# Patient Record
Sex: FEMALE | Race: OTHER | Employment: OTHER | URBAN - METROPOLITAN AREA
[De-identification: names, ages, dates, MRNs, and addresses within clinical notes are randomized per-mention and may not be internally consistent; named-entity substitution may affect disease eponyms.]

---
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
## 2017-11-30 NOTE — PATIENT DISCUSSION
New Prescription: prednisolone acetate (prednisolone acetate): drops,suspension: 1% 1 drop four times a day as directed into right eye 11-

## 2017-11-30 NOTE — PATIENT DISCUSSION
New Prescription: ketorolac (ketorolac): drops: 0.4% 1 drop four times a day as directed into right eye 11-

## 2017-11-30 NOTE — PATIENT DISCUSSION
New Prescription: ofloxacin (ofloxacin): drops: 0.3% 1 drop four times a day as directed into right eye 11-

## 2018-01-11 NOTE — PATIENT DISCUSSION
Called patient and left message for patient informing of refill sent to pharmacy for 30 day supply and to call clinic to schedule with MD a blood pressure office visit.    S/P PCIOL, OD_ - STABLE, DOING WELL. OK TO PROCEED WITH FELLOW EYE SURGERY.

## 2018-01-11 NOTE — PATIENT DISCUSSION
Continue: prednisolone acetate (prednisolone acetate): drops,suspension: 1% 1 drop four times a day as directed into right eye 11-

## 2018-01-11 NOTE — PATIENT DISCUSSION
Surgery  Counseling: I have discussed the option of glasses versus cataract surgery versus following . It was explained that when vision no longer meets the patient?s visual needs and a new prescription for glasses is not likely to improve all of the patient?s visual symptoms, the option of cataract surgery is a reasonable next step. It was explained that there is no guarantee that removing the cataract will improve their visual symptoms, however; it is believed that the cataract is contributing to the patient's visual impairment and surgery may significantly improve both the visual and functional status of the patient. The risks, benefits and alternatives of surgery were discussed with the patient. After this discussion, the patient desires to proceed with cataract surgery with implantation of an intraocular lens to improve vision for night driving .

## 2018-01-26 NOTE — PATIENT DISCUSSION
S/P PC IOL, OS: GOOD POST OP RESULT. CONTINUE DROPS AS DIRECTED UNTIL FURTHER INSTRUCTION. DROP SHEET PROVIDED. RETURN FOR FOLLOW-UP AS SCHEDULED.

## 2018-02-15 ENCOUNTER — NEW PATIENT COMPREHENSIVE (OUTPATIENT)
Dept: URBAN - METROPOLITAN AREA CLINIC 35 | Facility: CLINIC | Age: 83
End: 2018-02-15

## 2018-02-15 DIAGNOSIS — H35.3134: ICD-10-CM

## 2018-02-15 DIAGNOSIS — H04.123: ICD-10-CM

## 2018-02-15 DIAGNOSIS — Z98.890: ICD-10-CM

## 2018-02-15 PROCEDURE — 1036F TOBACCO NON-USER: CPT

## 2018-02-15 PROCEDURE — G8427 DOCREV CUR MEDS BY ELIG CLIN: HCPCS

## 2018-02-15 PROCEDURE — 99204 OFFICE O/P NEW MOD 45 MIN: CPT

## 2018-02-15 PROCEDURE — 92015 DETERMINE REFRACTIVE STATE: CPT

## 2018-02-15 PROCEDURE — 92134 CPTRZ OPH DX IMG PST SGM RTA: CPT

## 2018-02-15 ASSESSMENT — VISUAL ACUITY
OS_SC: CF 1FT
OS_CC: CF 2FT
OD_CC: 20/80-1
OD_SC: 20/400

## 2018-02-15 ASSESSMENT — TONOMETRY
OS_IOP_MMHG: 13
OD_IOP_MMHG: 11

## 2018-05-22 ENCOUNTER — FOLLOW UP (OUTPATIENT)
Dept: URBAN - METROPOLITAN AREA CLINIC 35 | Facility: CLINIC | Age: 83
End: 2018-05-22

## 2018-05-22 DIAGNOSIS — H04.123: ICD-10-CM

## 2018-05-22 DIAGNOSIS — H35.3134: ICD-10-CM

## 2018-05-22 DIAGNOSIS — Z98.890: ICD-10-CM

## 2018-05-22 PROCEDURE — 92134 CPTRZ OPH DX IMG PST SGM RTA: CPT

## 2018-05-22 PROCEDURE — 1036F TOBACCO NON-USER: CPT

## 2018-05-22 PROCEDURE — G8427 DOCREV CUR MEDS BY ELIG CLIN: HCPCS

## 2018-05-22 PROCEDURE — 92015 DETERMINE REFRACTIVE STATE: CPT

## 2018-05-22 PROCEDURE — 92014 COMPRE OPH EXAM EST PT 1/>: CPT

## 2018-05-22 PROCEDURE — 2019F DILATED MACUL EXAM DONE: CPT

## 2018-05-22 PROCEDURE — 4177F TALK PT/CRGVR RE AREDS PREV: CPT

## 2018-05-22 ASSESSMENT — VISUAL ACUITY
OD_CC: 20/100-1
OD_PH: 20/100
OS_PH: CF 1FT
OD_CC: J10
OS_CC: CF 1FT
OS_CC: J12-

## 2018-05-22 ASSESSMENT — TONOMETRY
OD_IOP_MMHG: 15
OS_IOP_MMHG: 15

## 2018-08-02 ENCOUNTER — RETINA CONSULT (OUTPATIENT)
Dept: URBAN - METROPOLITAN AREA CLINIC 35 | Facility: CLINIC | Age: 83
End: 2018-08-02

## 2018-08-02 DIAGNOSIS — H35.3114: ICD-10-CM

## 2018-08-02 DIAGNOSIS — H35.3124: ICD-10-CM

## 2018-08-02 DIAGNOSIS — H43.813: ICD-10-CM

## 2018-08-02 DIAGNOSIS — H35.363: ICD-10-CM

## 2018-08-02 PROCEDURE — 9222550 BILAT EXTENDED OPHTHALMOSCOPY, FIRST

## 2018-08-02 PROCEDURE — 92250 FUNDUS PHOTOGRAPHY W/I&R: CPT

## 2018-08-02 PROCEDURE — G8428 CUR MEDS NOT DOCUMENT: HCPCS

## 2018-08-02 PROCEDURE — 1036F TOBACCO NON-USER: CPT

## 2018-08-02 PROCEDURE — 4177F TALK PT/CRGVR RE AREDS PREV: CPT

## 2018-08-02 PROCEDURE — G8756 NO BP MEASURE DOC: HCPCS

## 2018-08-02 PROCEDURE — 92235 FLUORESCEIN ANGRPH MLTIFRAME: CPT

## 2018-08-02 PROCEDURE — 2019F DILATED MACUL EXAM DONE: CPT

## 2018-08-02 PROCEDURE — 92014 COMPRE OPH EXAM EST PT 1/>: CPT

## 2018-08-02 ASSESSMENT — TONOMETRY
OS_IOP_MMHG: 14
OD_IOP_MMHG: 13

## 2018-08-02 ASSESSMENT — VISUAL ACUITY
OD_CC: 20/200+1
OS_CC: CF 3FT

## 2018-11-14 ENCOUNTER — ESTABLISHED COMPREHENSIVE EXAM (OUTPATIENT)
Dept: URBAN - METROPOLITAN AREA CLINIC 35 | Facility: CLINIC | Age: 83
End: 2018-11-14

## 2018-11-14 DIAGNOSIS — H35.3124: ICD-10-CM

## 2018-11-14 DIAGNOSIS — H04.123: ICD-10-CM

## 2018-11-14 DIAGNOSIS — H35.3114: ICD-10-CM

## 2018-11-14 DIAGNOSIS — H43.813: ICD-10-CM

## 2018-11-14 DIAGNOSIS — H35.363: ICD-10-CM

## 2018-11-14 PROCEDURE — 2019F DILATED MACUL EXAM DONE: CPT

## 2018-11-14 PROCEDURE — 92015 DETERMINE REFRACTIVE STATE: CPT

## 2018-11-14 PROCEDURE — 92014 COMPRE OPH EXAM EST PT 1/>: CPT

## 2018-11-14 PROCEDURE — 1036F TOBACCO NON-USER: CPT

## 2018-11-14 PROCEDURE — G8427 DOCREV CUR MEDS BY ELIG CLIN: HCPCS

## 2018-11-14 PROCEDURE — G9903 PT SCRN TBCO ID AS NON USER: HCPCS

## 2018-11-14 PROCEDURE — 4177F TALK PT/CRGVR RE AREDS PREV: CPT

## 2018-11-14 ASSESSMENT — VISUAL ACUITY
OD_CC: 20/100+
OD_SC: 20/400-
OD_CC: J8-
OS_CC: J12-
OS_PH: CF 4FT
OS_SC: CF 2FT
OS_CC: CF 2FT

## 2018-11-14 ASSESSMENT — TONOMETRY
OS_IOP_MMHG: 10
OD_IOP_MMHG: 10

## 2019-01-03 ENCOUNTER — ESTABLISHED COMPREHENSIVE EXAM (OUTPATIENT)
Dept: URBAN - METROPOLITAN AREA CLINIC 35 | Facility: CLINIC | Age: 84
End: 2019-01-03

## 2019-01-03 DIAGNOSIS — H35.3114: ICD-10-CM

## 2019-01-03 DIAGNOSIS — H43.813: ICD-10-CM

## 2019-01-03 DIAGNOSIS — H35.363: ICD-10-CM

## 2019-01-03 DIAGNOSIS — H35.723: ICD-10-CM

## 2019-01-03 DIAGNOSIS — H43.22: ICD-10-CM

## 2019-01-03 DIAGNOSIS — H35.3124: ICD-10-CM

## 2019-01-03 PROCEDURE — 92250 FUNDUS PHOTOGRAPHY W/I&R: CPT

## 2019-01-03 PROCEDURE — G9903 PT SCRN TBCO ID AS NON USER: HCPCS

## 2019-01-03 PROCEDURE — 2019F DILATED MACUL EXAM DONE: CPT

## 2019-01-03 PROCEDURE — 92235 FLUORESCEIN ANGRPH MLTIFRAME: CPT

## 2019-01-03 PROCEDURE — 92134 CPTRZ OPH DX IMG PST SGM RTA: CPT

## 2019-01-03 PROCEDURE — 92014 COMPRE OPH EXAM EST PT 1/>: CPT

## 2019-01-03 PROCEDURE — 1036F TOBACCO NON-USER: CPT

## 2019-01-03 PROCEDURE — 4177F TALK PT/CRGVR RE AREDS PREV: CPT

## 2019-01-03 PROCEDURE — 9222650 BILAT EXTENDED OPHTHALMOSCOPY, F/U

## 2019-01-03 PROCEDURE — G8756 NO BP MEASURE DOC: HCPCS

## 2019-01-03 PROCEDURE — G8427 DOCREV CUR MEDS BY ELIG CLIN: HCPCS

## 2019-01-03 ASSESSMENT — TONOMETRY
OS_IOP_MMHG: 11
OD_IOP_MMHG: 9

## 2019-01-03 ASSESSMENT — VISUAL ACUITY
OS_CC: CF 2FT
OD_CC: CF 6FT

## 2019-05-22 ENCOUNTER — ESTABLISHED COMPREHENSIVE EXAM (OUTPATIENT)
Dept: URBAN - METROPOLITAN AREA CLINIC 35 | Facility: CLINIC | Age: 84
End: 2019-05-22

## 2019-05-22 DIAGNOSIS — H35.3124: ICD-10-CM

## 2019-05-22 DIAGNOSIS — H35.3114: ICD-10-CM

## 2019-05-22 DIAGNOSIS — H35.723: ICD-10-CM

## 2019-05-22 DIAGNOSIS — H52.7: ICD-10-CM

## 2019-05-22 DIAGNOSIS — H43.22: ICD-10-CM

## 2019-05-22 DIAGNOSIS — H43.813: ICD-10-CM

## 2019-05-22 DIAGNOSIS — H04.123: ICD-10-CM

## 2019-05-22 DIAGNOSIS — H35.30: ICD-10-CM

## 2019-05-22 DIAGNOSIS — H35.363: ICD-10-CM

## 2019-05-22 PROCEDURE — 92015 DETERMINE REFRACTIVE STATE: CPT

## 2019-05-22 PROCEDURE — 92014 COMPRE OPH EXAM EST PT 1/>: CPT

## 2019-05-22 ASSESSMENT — VISUAL ACUITY
OS_CC: CF 2FT
OS_SC: CF 2FT
OD_CC: J10
OS_CC: J18
OD_SC: 20/400-
OD_CC: 20/100-1

## 2019-05-22 ASSESSMENT — TONOMETRY
OD_IOP_MMHG: 10
OS_IOP_MMHG: 10

## 2019-12-05 ENCOUNTER — ESTABLISHED COMPREHENSIVE EXAM (OUTPATIENT)
Dept: URBAN - METROPOLITAN AREA CLINIC 35 | Facility: CLINIC | Age: 84
End: 2019-12-05

## 2019-12-05 DIAGNOSIS — H43.813: ICD-10-CM

## 2019-12-05 DIAGNOSIS — H04.123: ICD-10-CM

## 2019-12-05 DIAGNOSIS — H35.363: ICD-10-CM

## 2019-12-05 DIAGNOSIS — H35.3124: ICD-10-CM

## 2019-12-05 DIAGNOSIS — H35.723: ICD-10-CM

## 2019-12-05 DIAGNOSIS — H35.3114: ICD-10-CM

## 2019-12-05 DIAGNOSIS — H35.30: ICD-10-CM

## 2019-12-05 DIAGNOSIS — H43.22: ICD-10-CM

## 2019-12-05 PROCEDURE — 92014 COMPRE OPH EXAM EST PT 1/>: CPT

## 2019-12-05 ASSESSMENT — VISUAL ACUITY
OS_CC: CF 2FT
OD_SC: 20/400
OD_PH: 20/100-
OD_CC: 20/200

## 2019-12-05 ASSESSMENT — TONOMETRY
OD_IOP_MMHG: 13
OS_IOP_MMHG: 11

## 2020-12-10 ENCOUNTER — ESTABLISHED COMPREHENSIVE EXAM (OUTPATIENT)
Dept: URBAN - METROPOLITAN AREA CLINIC 35 | Facility: CLINIC | Age: 85
End: 2020-12-10

## 2020-12-10 DIAGNOSIS — H43.813: ICD-10-CM

## 2020-12-10 DIAGNOSIS — H04.123: ICD-10-CM

## 2020-12-10 DIAGNOSIS — H35.3124: ICD-10-CM

## 2020-12-10 DIAGNOSIS — H35.363: ICD-10-CM

## 2020-12-10 DIAGNOSIS — H35.723: ICD-10-CM

## 2020-12-10 DIAGNOSIS — Z98.890: ICD-10-CM

## 2020-12-10 DIAGNOSIS — H43.22: ICD-10-CM

## 2020-12-10 DIAGNOSIS — H35.3114: ICD-10-CM

## 2020-12-10 DIAGNOSIS — H35.30: ICD-10-CM

## 2020-12-10 PROCEDURE — 92014 COMPRE OPH EXAM EST PT 1/>: CPT

## 2020-12-10 ASSESSMENT — VISUAL ACUITY
OD_SC: CF 3FT
OD_CC: 20/200
OS_CC: CF 2FT

## 2020-12-10 ASSESSMENT — TONOMETRY
OS_IOP_MMHG: 16
OD_IOP_MMHG: 14

## 2021-08-05 ENCOUNTER — EST. PATIENT EMERGENCY (OUTPATIENT)
Dept: URBAN - METROPOLITAN AREA CLINIC 35 | Facility: CLINIC | Age: 86
End: 2021-08-05

## 2021-08-05 DIAGNOSIS — H04.123: ICD-10-CM

## 2021-08-05 DIAGNOSIS — Z98.890: ICD-10-CM

## 2021-08-05 DIAGNOSIS — H43.22: ICD-10-CM

## 2021-08-05 DIAGNOSIS — H35.3114: ICD-10-CM

## 2021-08-05 DIAGNOSIS — H43.813: ICD-10-CM

## 2021-08-05 DIAGNOSIS — H35.3124: ICD-10-CM

## 2021-08-05 DIAGNOSIS — H35.723: ICD-10-CM

## 2021-08-05 DIAGNOSIS — H35.363: ICD-10-CM

## 2021-08-05 DIAGNOSIS — H35.30: ICD-10-CM

## 2021-08-05 PROCEDURE — 92014 COMPRE OPH EXAM EST PT 1/>: CPT

## 2021-08-05 ASSESSMENT — VISUAL ACUITY
OS_CC: CF 2FT
OD_CC: 20/200

## 2021-08-05 ASSESSMENT — TONOMETRY
OS_IOP_MMHG: 15
OD_IOP_MMHG: 14

## 2021-12-16 ENCOUNTER — COMPREHENSIVE EXAM (OUTPATIENT)
Dept: URBAN - METROPOLITAN AREA CLINIC 35 | Facility: CLINIC | Age: 86
End: 2021-12-16

## 2021-12-16 DIAGNOSIS — H35.30: ICD-10-CM

## 2021-12-16 DIAGNOSIS — H35.723: ICD-10-CM

## 2021-12-16 DIAGNOSIS — H35.3124: ICD-10-CM

## 2021-12-16 DIAGNOSIS — H35.3114: ICD-10-CM

## 2021-12-16 DIAGNOSIS — H43.22: ICD-10-CM

## 2021-12-16 DIAGNOSIS — H04.123: ICD-10-CM

## 2021-12-16 DIAGNOSIS — H43.813: ICD-10-CM

## 2021-12-16 DIAGNOSIS — H35.363: ICD-10-CM

## 2021-12-16 DIAGNOSIS — Z98.890: ICD-10-CM

## 2021-12-16 PROCEDURE — 92014 COMPRE OPH EXAM EST PT 1/>: CPT

## 2021-12-16 PROCEDURE — 92134 CPTRZ OPH DX IMG PST SGM RTA: CPT

## 2021-12-16 ASSESSMENT — VISUAL ACUITY: OD_CC: 20/400 BLURRY

## 2022-06-15 ENCOUNTER — APPOINTMENT (RX ONLY)
Dept: URBAN - METROPOLITAN AREA CLINIC 130 | Facility: CLINIC | Age: 87
Setting detail: DERMATOLOGY
End: 2022-06-15

## 2022-06-15 DIAGNOSIS — D18.0 HEMANGIOMA: ICD-10-CM

## 2022-06-15 DIAGNOSIS — Z85.828 PERSONAL HISTORY OF OTHER MALIGNANT NEOPLASM OF SKIN: ICD-10-CM

## 2022-06-15 DIAGNOSIS — D22 MELANOCYTIC NEVI: ICD-10-CM

## 2022-06-15 DIAGNOSIS — L57.0 ACTINIC KERATOSIS: ICD-10-CM

## 2022-06-15 DIAGNOSIS — L82.1 OTHER SEBORRHEIC KERATOSIS: ICD-10-CM

## 2022-06-15 DIAGNOSIS — L57.8 OTHER SKIN CHANGES DUE TO CHRONIC EXPOSURE TO NONIONIZING RADIATION: ICD-10-CM

## 2022-06-15 PROBLEM — D22.5 MELANOCYTIC NEVI OF TRUNK: Status: ACTIVE | Noted: 2022-06-15

## 2022-06-15 PROBLEM — D18.01 HEMANGIOMA OF SKIN AND SUBCUTANEOUS TISSUE: Status: ACTIVE | Noted: 2022-06-15

## 2022-06-15 PROCEDURE — 99203 OFFICE O/P NEW LOW 30 MIN: CPT | Mod: 25

## 2022-06-15 PROCEDURE — 17003 DESTRUCT PREMALG LES 2-14: CPT

## 2022-06-15 PROCEDURE — ? COUNSELING

## 2022-06-15 PROCEDURE — ? LIQUID NITROGEN

## 2022-06-15 PROCEDURE — 17000 DESTRUCT PREMALG LESION: CPT

## 2022-06-15 ASSESSMENT — LOCATION ZONE DERM
LOCATION ZONE: FACE
LOCATION ZONE: ARM
LOCATION ZONE: TRUNK

## 2022-06-15 ASSESSMENT — LOCATION DETAILED DESCRIPTION DERM
LOCATION DETAILED: RIGHT VENTRAL DISTAL FOREARM
LOCATION DETAILED: RIGHT CENTRAL BUCCAL CHEEK
LOCATION DETAILED: PERIUMBILICAL SKIN
LOCATION DETAILED: LEFT VENTRAL PROXIMAL FOREARM
LOCATION DETAILED: LEFT PROXIMAL RADIAL DORSAL FOREARM
LOCATION DETAILED: LEFT LATERAL ABDOMEN
LOCATION DETAILED: RIGHT INFERIOR MEDIAL FOREHEAD

## 2022-06-15 ASSESSMENT — LOCATION SIMPLE DESCRIPTION DERM
LOCATION SIMPLE: ABDOMEN
LOCATION SIMPLE: RIGHT CHEEK
LOCATION SIMPLE: RIGHT FOREHEAD
LOCATION SIMPLE: RIGHT FOREARM
LOCATION SIMPLE: LEFT FOREARM

## 2022-06-15 NOTE — PROCEDURE: LIQUID NITROGEN
Duration Of Freeze Thaw-Cycle (Seconds): 8
Show Aperture Variable?: Yes
Number Of Freeze-Thaw Cycles: 2 freeze-thaw cycles
Post-Care Instructions: I reviewed with the patient in detail both written and verbal post care instructions. Avoid swimming until the treated area heals. patient verbalizes understanding. Written instructions provided.
Detail Level: Detailed
Consent: The patient's consent was obtained including but not limited to risks of crusting, scabbing, blistering, scarring, darker or lighter pigmentary change, recurrence, incomplete removal and infection.
Application Tool (Optional): Liquid Nitrogen Sprayer

## 2022-07-26 ENCOUNTER — COMPREHENSIVE EXAM (OUTPATIENT)
Dept: URBAN - METROPOLITAN AREA CLINIC 35 | Facility: CLINIC | Age: 87
End: 2022-07-26

## 2022-07-26 DIAGNOSIS — H35.3134: ICD-10-CM

## 2022-07-26 DIAGNOSIS — H35.363: ICD-10-CM

## 2022-07-26 DIAGNOSIS — H43.22: ICD-10-CM

## 2022-07-26 DIAGNOSIS — H04.123: ICD-10-CM

## 2022-07-26 DIAGNOSIS — H35.30: ICD-10-CM

## 2022-07-26 DIAGNOSIS — Z98.890: ICD-10-CM

## 2022-07-26 DIAGNOSIS — H35.723: ICD-10-CM

## 2022-07-26 DIAGNOSIS — H43.813: ICD-10-CM

## 2022-07-26 PROCEDURE — 92134 CPTRZ OPH DX IMG PST SGM RTA: CPT

## 2022-07-26 PROCEDURE — 92014 COMPRE OPH EXAM EST PT 1/>: CPT

## 2022-07-26 ASSESSMENT — VISUAL ACUITY: OD_CC: 20/400 BLURRY

## 2022-09-13 ENCOUNTER — APPOINTMENT (RX ONLY)
Dept: URBAN - METROPOLITAN AREA CLINIC 130 | Facility: CLINIC | Age: 87
Setting detail: DERMATOLOGY
End: 2022-09-13

## 2022-09-13 DIAGNOSIS — L57.0 ACTINIC KERATOSIS: ICD-10-CM

## 2022-09-13 DIAGNOSIS — Z85.828 PERSONAL HISTORY OF OTHER MALIGNANT NEOPLASM OF SKIN: ICD-10-CM

## 2022-09-13 DIAGNOSIS — L82.1 OTHER SEBORRHEIC KERATOSIS: ICD-10-CM

## 2022-09-13 PROCEDURE — ? LIQUID NITROGEN

## 2022-09-13 PROCEDURE — 17000 DESTRUCT PREMALG LESION: CPT

## 2022-09-13 PROCEDURE — 99214 OFFICE O/P EST MOD 30 MIN: CPT | Mod: 25

## 2022-09-13 PROCEDURE — ? COUNSELING

## 2022-09-13 ASSESSMENT — LOCATION DETAILED DESCRIPTION DERM
LOCATION DETAILED: NASAL DORSUM
LOCATION DETAILED: LEFT INFERIOR FOREHEAD

## 2022-09-13 ASSESSMENT — LOCATION SIMPLE DESCRIPTION DERM
LOCATION SIMPLE: NOSE
LOCATION SIMPLE: LEFT FOREHEAD

## 2022-09-13 ASSESSMENT — LOCATION ZONE DERM
LOCATION ZONE: NOSE
LOCATION ZONE: FACE

## 2022-09-13 NOTE — PROCEDURE: LIQUID NITROGEN
Number Of Freeze-Thaw Cycles: 2 freeze-thaw cycles
Render Post-Care Instructions In Note?: yes
Detail Level: Detailed
Post-Care Instructions: I reviewed with the patient in detail both written and verbal post care instructions. Avoid swimming until the treated area heals. patient verbalizes understanding. Written instructions provided.
Application Tool (Optional): Liquid Nitrogen Sprayer
Duration Of Freeze Thaw-Cycle (Seconds): 8
Consent: The patient's consent was obtained including but not limited to risks of crusting, scabbing, blistering, scarring, darker or lighter pigmentary change, recurrence, incomplete removal and infection.

## 2023-01-31 ENCOUNTER — COMPREHENSIVE EXAM (OUTPATIENT)
Dept: URBAN - METROPOLITAN AREA CLINIC 35 | Facility: CLINIC | Age: 88
End: 2023-01-31

## 2023-01-31 DIAGNOSIS — H35.723: ICD-10-CM

## 2023-01-31 DIAGNOSIS — H35.3134: ICD-10-CM

## 2023-01-31 DIAGNOSIS — H04.123: ICD-10-CM

## 2023-01-31 DIAGNOSIS — Z98.890: ICD-10-CM

## 2023-01-31 DIAGNOSIS — H43.813: ICD-10-CM

## 2023-01-31 DIAGNOSIS — H35.30: ICD-10-CM

## 2023-01-31 DIAGNOSIS — H43.22: ICD-10-CM

## 2023-01-31 DIAGNOSIS — H35.363: ICD-10-CM

## 2023-01-31 PROCEDURE — 92134 CPTRZ OPH DX IMG PST SGM RTA: CPT

## 2023-01-31 PROCEDURE — 92014 COMPRE OPH EXAM EST PT 1/>: CPT

## 2023-01-31 ASSESSMENT — TONOMETRY
OS_IOP_MMHG: 15
OD_IOP_MMHG: 14

## 2023-01-31 ASSESSMENT — VISUAL ACUITY: OU_CC: 20/400

## 2023-08-02 ENCOUNTER — ESTABLISHED PATIENT (OUTPATIENT)
Dept: URBAN - METROPOLITAN AREA CLINIC 35 | Facility: CLINIC | Age: 88
End: 2023-08-02

## 2023-08-02 DIAGNOSIS — H35.723: ICD-10-CM

## 2023-08-02 DIAGNOSIS — Z98.890: ICD-10-CM

## 2023-08-02 DIAGNOSIS — H35.30: ICD-10-CM

## 2023-08-02 DIAGNOSIS — H04.123: ICD-10-CM

## 2023-08-02 DIAGNOSIS — H43.22: ICD-10-CM

## 2023-08-02 DIAGNOSIS — H35.363: ICD-10-CM

## 2023-08-02 DIAGNOSIS — H35.3134: ICD-10-CM

## 2023-08-02 DIAGNOSIS — H43.813: ICD-10-CM

## 2023-08-02 DIAGNOSIS — H26.491: ICD-10-CM

## 2023-08-02 PROCEDURE — 92250 FUNDUS PHOTOGRAPHY W/I&R: CPT

## 2023-08-02 PROCEDURE — 92014 COMPRE OPH EXAM EST PT 1/>: CPT

## 2023-08-02 ASSESSMENT — TONOMETRY
OD_IOP_MMHG: 18
OS_IOP_MMHG: 18

## 2023-08-02 ASSESSMENT — VISUAL ACUITY
OD_CC: CF 6FT
OU_CC: CF 6FT

## 2024-02-05 ENCOUNTER — APPOINTMENT (RX ONLY)
Dept: URBAN - METROPOLITAN AREA CLINIC 130 | Facility: CLINIC | Age: 89
Setting detail: DERMATOLOGY
End: 2024-02-05

## 2024-02-05 DIAGNOSIS — L57.8 OTHER SKIN CHANGES DUE TO CHRONIC EXPOSURE TO NONIONIZING RADIATION: ICD-10-CM

## 2024-02-05 DIAGNOSIS — L82.0 INFLAMED SEBORRHEIC KERATOSIS: ICD-10-CM

## 2024-02-05 DIAGNOSIS — L82.1 OTHER SEBORRHEIC KERATOSIS: ICD-10-CM

## 2024-02-05 PROCEDURE — 17110 DESTRUCTION B9 LES UP TO 14: CPT

## 2024-02-05 PROCEDURE — ? SUNSCREEN RECOMMENDATIONS

## 2024-02-05 PROCEDURE — 99213 OFFICE O/P EST LOW 20 MIN: CPT | Mod: 25

## 2024-02-05 PROCEDURE — ? LIQUID NITROGEN

## 2024-02-05 PROCEDURE — ? COUNSELING

## 2024-02-05 ASSESSMENT — LOCATION ZONE DERM
LOCATION ZONE: FACE
LOCATION ZONE: ARM

## 2024-02-05 ASSESSMENT — LOCATION DETAILED DESCRIPTION DERM
LOCATION DETAILED: LEFT ANTERIOR SHOULDER
LOCATION DETAILED: LEFT FOREHEAD
LOCATION DETAILED: LEFT ANTERIOR PROXIMAL UPPER ARM

## 2024-02-05 ASSESSMENT — LOCATION SIMPLE DESCRIPTION DERM
LOCATION SIMPLE: LEFT FOREHEAD
LOCATION SIMPLE: LEFT UPPER ARM
LOCATION SIMPLE: LEFT SHOULDER

## 2024-02-05 NOTE — PROCEDURE: LIQUID NITROGEN
Spray Paint Technique: No
Show Topical Anesthesia Variable?: Yes
Medical Necessity Clause: This procedure was medically necessary because the lesions that were treated were: enlarging and causing irritation.
Number Of Freeze-Thaw Cycles: 1 freeze-thaw cycle
Medical Necessity Information: It is in your best interest to select a reason for this procedure from the list below. All of these items fulfill various CMS LCD requirements except the new and changing color options.
Spray Paint Text: The liquid nitrogen was applied to the skin utilizing a spray paint frosting technique.
Application Tool (Optional): Cry-AC
Detail Level: Detailed
Consent: The patient's consent was obtained including but not limited to risks of crusting, scabbing, blistering, scarring, darker or lighter pigmentary change, recurrence, incomplete removal and infection.
Post-Care Instructions: I reviewed with the patient in detail both written and verbal post care instructions. Avoid swimming until the treated area heals. patient verbalizes understanding. Written instructions provided. Written instructions provided
Duration Of Freeze Thaw-Cycle (Seconds): 5-10

## 2024-02-07 ENCOUNTER — COMPREHENSIVE EXAM (OUTPATIENT)
Dept: URBAN - METROPOLITAN AREA CLINIC 35 | Facility: CLINIC | Age: 89
End: 2024-02-07

## 2024-02-07 DIAGNOSIS — H43.22: ICD-10-CM

## 2024-02-07 DIAGNOSIS — H35.723: ICD-10-CM

## 2024-02-07 DIAGNOSIS — Z98.890: ICD-10-CM

## 2024-02-07 DIAGNOSIS — H43.813: ICD-10-CM

## 2024-02-07 DIAGNOSIS — H35.363: ICD-10-CM

## 2024-02-07 DIAGNOSIS — H04.123: ICD-10-CM

## 2024-02-07 DIAGNOSIS — H26.491: ICD-10-CM

## 2024-02-07 DIAGNOSIS — H35.3134: ICD-10-CM

## 2024-02-07 DIAGNOSIS — H35.30: ICD-10-CM

## 2024-02-07 PROCEDURE — 92014 COMPRE OPH EXAM EST PT 1/>: CPT

## 2024-02-07 PROCEDURE — 92134 CPTRZ OPH DX IMG PST SGM RTA: CPT

## 2024-02-07 ASSESSMENT — TONOMETRY
OD_IOP_MMHG: 17
OS_IOP_MMHG: 18

## 2024-02-07 ASSESSMENT — VISUAL ACUITY
OS_CC: CF 1FT
OU_CC: CF 6FT
OD_CC: CF 1FT

## 2024-02-22 ENCOUNTER — ESTABLISHED PATIENT (OUTPATIENT)
Dept: URBAN - METROPOLITAN AREA CLINIC 35 | Facility: CLINIC | Age: 89
End: 2024-02-22

## 2024-02-22 DIAGNOSIS — H35.363: ICD-10-CM

## 2024-02-22 DIAGNOSIS — H35.30: ICD-10-CM

## 2024-02-22 DIAGNOSIS — H35.723: ICD-10-CM

## 2024-02-22 DIAGNOSIS — H35.3134: ICD-10-CM

## 2024-02-22 DIAGNOSIS — H26.491: ICD-10-CM

## 2024-02-22 DIAGNOSIS — H43.22: ICD-10-CM

## 2024-02-22 DIAGNOSIS — H43.813: ICD-10-CM

## 2024-02-22 DIAGNOSIS — H04.123: ICD-10-CM

## 2024-02-22 PROCEDURE — 92250 FUNDUS PHOTOGRAPHY W/I&R: CPT

## 2024-02-22 PROCEDURE — 99214 OFFICE O/P EST MOD 30 MIN: CPT

## 2024-02-22 ASSESSMENT — VISUAL ACUITY
OS_CC: CF 2FT
OD_CC: CF 3FT

## 2024-02-22 ASSESSMENT — TONOMETRY
OD_IOP_MMHG: 10
OS_IOP_MMHG: 09

## 2024-04-23 ENCOUNTER — APPOINTMENT (RX ONLY)
Dept: URBAN - METROPOLITAN AREA CLINIC 130 | Facility: CLINIC | Age: 89
Setting detail: DERMATOLOGY
End: 2024-04-23

## 2024-04-23 DIAGNOSIS — L82.1 OTHER SEBORRHEIC KERATOSIS: ICD-10-CM

## 2024-04-23 DIAGNOSIS — L82.0 INFLAMED SEBORRHEIC KERATOSIS: ICD-10-CM

## 2024-04-23 DIAGNOSIS — L91.8 OTHER HYPERTROPHIC DISORDERS OF THE SKIN: ICD-10-CM

## 2024-04-23 DIAGNOSIS — Z85.828 PERSONAL HISTORY OF OTHER MALIGNANT NEOPLASM OF SKIN: ICD-10-CM

## 2024-04-23 DIAGNOSIS — L57.0 ACTINIC KERATOSIS: ICD-10-CM

## 2024-04-23 PROCEDURE — 17110 DESTRUCTION B9 LES UP TO 14: CPT

## 2024-04-23 PROCEDURE — ? LIQUID NITROGEN

## 2024-04-23 PROCEDURE — ? COUNSELING

## 2024-04-23 PROCEDURE — 99213 OFFICE O/P EST LOW 20 MIN: CPT | Mod: 25

## 2024-04-23 PROCEDURE — 17000 DESTRUCT PREMALG LESION: CPT | Mod: 59

## 2024-04-23 ASSESSMENT — LOCATION ZONE DERM
LOCATION ZONE: NOSE
LOCATION ZONE: NECK
LOCATION ZONE: FACE

## 2024-04-23 ASSESSMENT — LOCATION DETAILED DESCRIPTION DERM
LOCATION DETAILED: LEFT SUPERIOR ANTERIOR NECK
LOCATION DETAILED: RIGHT INFERIOR ANTERIOR NECK
LOCATION DETAILED: LEFT LATERAL MANDIBULAR CHEEK
LOCATION DETAILED: NASAL SUPRATIP

## 2024-04-23 ASSESSMENT — LOCATION SIMPLE DESCRIPTION DERM
LOCATION SIMPLE: LEFT CHEEK
LOCATION SIMPLE: NOSE
LOCATION SIMPLE: LEFT ANTERIOR NECK
LOCATION SIMPLE: RIGHT ANTERIOR NECK

## 2024-04-23 NOTE — PROCEDURE: LIQUID NITROGEN
Medical Necessity Clause: This procedure was medically necessary because the lesions that were treated were: enlarging and causing irritation.
Add 52 Modifier (Optional): no
Render Post-Care Instructions In Note?: yes
Application Tool (Optional): Cry-AC
Spray Paint Text: The liquid nitrogen was applied to the skin utilizing a spray paint frosting technique.
Detail Level: Detailed
Duration Of Freeze Thaw-Cycle (Seconds): 5-10
Post-Care Instructions: I reviewed with the patient in detail both written and verbal post care instructions. Avoid swimming until the treated area heals. patient verbalizes understanding. Written instructions provided. Written instructions provided written instructions provided
Consent: The patient's consent was obtained including but not limited to risks of crusting, scabbing, blistering, scarring, darker or lighter pigmentary change, recurrence, incomplete removal and infection.
Medical Necessity Information: It is in your best interest to select a reason for this procedure from the list below. All of these items fulfill various CMS LCD requirements except the new and changing color options.
Number Of Freeze-Thaw Cycles: 1 freeze-thaw cycle
Application Tool (Optional): Liquid Nitrogen Sprayer
Duration Of Freeze Thaw-Cycle (Seconds): 8

## 2024-08-22 ENCOUNTER — COMPREHENSIVE EXAM (OUTPATIENT)
Dept: URBAN - METROPOLITAN AREA CLINIC 35 | Facility: CLINIC | Age: 89
End: 2024-08-22

## 2024-08-22 DIAGNOSIS — H43.22: ICD-10-CM

## 2024-08-22 DIAGNOSIS — H35.363: ICD-10-CM

## 2024-08-22 DIAGNOSIS — H35.3134: ICD-10-CM

## 2024-08-22 DIAGNOSIS — H35.723: ICD-10-CM

## 2024-08-22 DIAGNOSIS — H35.30: ICD-10-CM

## 2024-08-22 DIAGNOSIS — H43.393: ICD-10-CM

## 2024-08-22 DIAGNOSIS — H26.491: ICD-10-CM

## 2024-08-22 DIAGNOSIS — H04.123: ICD-10-CM

## 2024-08-22 PROCEDURE — 99213 OFFICE O/P EST LOW 20 MIN: CPT

## 2024-08-22 PROCEDURE — 92250 FUNDUS PHOTOGRAPHY W/I&R: CPT

## 2024-08-22 ASSESSMENT — VISUAL ACUITY
OS_SC: CF 2FT
OD_SC: CF 4FT

## 2024-08-22 ASSESSMENT — TONOMETRY
OS_IOP_MMHG: 12
OD_IOP_MMHG: 10

## 2024-12-02 ENCOUNTER — APPOINTMENT (OUTPATIENT)
Dept: URBAN - METROPOLITAN AREA CLINIC 130 | Facility: CLINIC | Age: 89
Setting detail: DERMATOLOGY
End: 2024-12-02

## 2024-12-02 DIAGNOSIS — Z85.828 PERSONAL HISTORY OF OTHER MALIGNANT NEOPLASM OF SKIN: ICD-10-CM

## 2024-12-02 DIAGNOSIS — L57.0 ACTINIC KERATOSIS: ICD-10-CM

## 2024-12-02 DIAGNOSIS — L82.0 INFLAMED SEBORRHEIC KERATOSIS: ICD-10-CM

## 2024-12-02 DIAGNOSIS — L57.8 OTHER SKIN CHANGES DUE TO CHRONIC EXPOSURE TO NONIONIZING RADIATION: ICD-10-CM

## 2024-12-02 PROCEDURE — ? LIQUID NITROGEN

## 2024-12-02 PROCEDURE — 17003 DESTRUCT PREMALG LES 2-14: CPT | Mod: 59

## 2024-12-02 PROCEDURE — 17000 DESTRUCT PREMALG LESION: CPT | Mod: 59

## 2024-12-02 PROCEDURE — 17110 DESTRUCTION B9 LES UP TO 14: CPT

## 2024-12-02 PROCEDURE — 99212 OFFICE O/P EST SF 10 MIN: CPT | Mod: 25

## 2024-12-02 PROCEDURE — ? COUNSELING

## 2024-12-02 PROCEDURE — ? SUNSCREEN RECOMMENDATIONS

## 2024-12-02 ASSESSMENT — LOCATION ZONE DERM
LOCATION ZONE: HAND
LOCATION ZONE: NECK
LOCATION ZONE: ARM
LOCATION ZONE: FACE
LOCATION ZONE: FINGER

## 2024-12-02 ASSESSMENT — LOCATION SIMPLE DESCRIPTION DERM
LOCATION SIMPLE: RIGHT SHOULDER
LOCATION SIMPLE: RIGHT MIDDLE FINGER
LOCATION SIMPLE: LEFT SHOULDER
LOCATION SIMPLE: LEFT ANTERIOR NECK
LOCATION SIMPLE: RIGHT HAND
LOCATION SIMPLE: RIGHT FOREHEAD

## 2024-12-02 ASSESSMENT — LOCATION DETAILED DESCRIPTION DERM
LOCATION DETAILED: LEFT ANTERIOR SHOULDER
LOCATION DETAILED: RIGHT ULNAR DORSAL HAND
LOCATION DETAILED: LEFT CLAVICULAR NECK
LOCATION DETAILED: RIGHT ANTERIOR SHOULDER
LOCATION DETAILED: RIGHT INFERIOR LATERAL FOREHEAD
LOCATION DETAILED: RIGHT PROXIMAL DORSAL MIDDLE FINGER

## 2024-12-02 NOTE — PROCEDURE: LIQUID NITROGEN
Render Note In Bullet Format When Appropriate: Yes
Number Of Freeze-Thaw Cycles: 1 freeze-thaw cycle
Application Tool (Optional): Liquid Nitrogen Sprayer
Detail Level: Detailed
Duration Of Freeze Thaw-Cycle (Seconds): 8
Consent: The patient's consent was obtained including but not limited to risks of crusting, scabbing, blistering, scarring, darker or lighter pigmentary change, recurrence, incomplete removal and infection.
Post-Care Instructions: I reviewed with the patient in detail both written and verbal post care instructions. Avoid swimming until the treated area heals. patient verbalizes understanding. Written instructions provided. Written instructions provided written instructions provided
Duration Of Freeze Thaw-Cycle (Seconds): 5-10
Include Z78.9 (Other Specified Conditions Influencing Health Status) As An Associated Diagnosis?: No
Post-Care Instructions: I reviewed with the patient in detail both written and verbal post care instructions. Avoid swimming until the treated area heals. patient verbalizes understanding. Written instructions provided. Written instructions provided written instructions provided. Written instruction provided
Medical Necessity Clause: This procedure was medically necessary because the lesions that were treated were: enlarging and causing irritation.
Spray Paint Text: The liquid nitrogen was applied to the skin utilizing a spray paint frosting technique.
Medical Necessity Information: It is in your best interest to select a reason for this procedure from the list below. All of these items fulfill various CMS LCD requirements except the new and changing color options.
Application Tool (Optional): Cry-AC

## 2025-01-14 ENCOUNTER — APPOINTMENT (OUTPATIENT)
Dept: URBAN - METROPOLITAN AREA CLINIC 130 | Facility: CLINIC | Age: OVER 89
Setting detail: DERMATOLOGY
End: 2025-01-14

## 2025-01-14 DIAGNOSIS — L82.1 OTHER SEBORRHEIC KERATOSIS: ICD-10-CM

## 2025-01-14 DIAGNOSIS — L82.0 INFLAMED SEBORRHEIC KERATOSIS: ICD-10-CM

## 2025-01-14 DIAGNOSIS — D22 MELANOCYTIC NEVI: ICD-10-CM

## 2025-01-14 DIAGNOSIS — Z85.828 PERSONAL HISTORY OF OTHER MALIGNANT NEOPLASM OF SKIN: ICD-10-CM

## 2025-01-14 DIAGNOSIS — L57.8 OTHER SKIN CHANGES DUE TO CHRONIC EXPOSURE TO NONIONIZING RADIATION: ICD-10-CM

## 2025-01-14 PROBLEM — D22.5 MELANOCYTIC NEVI OF TRUNK: Status: ACTIVE | Noted: 2025-01-14

## 2025-01-14 PROCEDURE — 17110 DESTRUCTION B9 LES UP TO 14: CPT

## 2025-01-14 PROCEDURE — 99213 OFFICE O/P EST LOW 20 MIN: CPT | Mod: 25

## 2025-01-14 PROCEDURE — ? COUNSELING

## 2025-01-14 PROCEDURE — ? LIQUID NITROGEN

## 2025-01-14 PROCEDURE — ? SUNSCREEN RECOMMENDATIONS

## 2025-01-14 ASSESSMENT — LOCATION DETAILED DESCRIPTION DERM
LOCATION DETAILED: EPIGASTRIC SKIN
LOCATION DETAILED: RIGHT INFERIOR MEDIAL FOREHEAD
LOCATION DETAILED: PERIUMBILICAL SKIN
LOCATION DETAILED: RIGHT LATERAL SUPERIOR CHEST

## 2025-01-14 ASSESSMENT — LOCATION SIMPLE DESCRIPTION DERM
LOCATION SIMPLE: CHEST
LOCATION SIMPLE: ABDOMEN
LOCATION SIMPLE: RIGHT FOREHEAD

## 2025-01-14 ASSESSMENT — LOCATION ZONE DERM
LOCATION ZONE: TRUNK
LOCATION ZONE: FACE

## 2025-01-14 NOTE — PROCEDURE: LIQUID NITROGEN
Medical Necessity Information: It is in your best interest to select a reason for this procedure from the list below. All of these items fulfill various CMS LCD requirements except the new and changing color options.
Show Spray Paint Technique Variable?: Yes
Application Tool (Optional): Cry-AC
Spray Paint Technique: No
Spray Paint Text: The liquid nitrogen was applied to the skin utilizing a spray paint frosting technique.
Duration Of Freeze Thaw-Cycle (Seconds): 5-10
Consent: The patient's consent was obtained including but not limited to risks of crusting, scabbing, blistering, scarring, darker or lighter pigmentary change, recurrence, incomplete removal and infection.
Post-Care Instructions: I reviewed with the patient in detail both written and verbal post care instructions. Avoid swimming until the treated area heals. patient verbalizes understanding. Written instructions provided. Written instructions provided written instructions provided. Written instruction provided
Detail Level: Detailed
Number Of Freeze-Thaw Cycles: 1 freeze-thaw cycle
Medical Necessity Clause: This procedure was medically necessary because the lesions that were treated were: enlarging and causing irritation.

## 2025-02-11 ENCOUNTER — COMPREHENSIVE EXAM (OUTPATIENT)
Age: OVER 89
End: 2025-02-11

## 2025-02-11 DIAGNOSIS — H35.3134: ICD-10-CM

## 2025-02-11 DIAGNOSIS — H04.123: ICD-10-CM

## 2025-02-11 DIAGNOSIS — H35.363: ICD-10-CM

## 2025-02-11 DIAGNOSIS — H35.723: ICD-10-CM

## 2025-02-11 DIAGNOSIS — Z98.890: ICD-10-CM

## 2025-02-11 DIAGNOSIS — H35.30: ICD-10-CM

## 2025-02-11 DIAGNOSIS — H26.491: ICD-10-CM

## 2025-02-11 DIAGNOSIS — H43.813: ICD-10-CM

## 2025-02-11 DIAGNOSIS — H43.22: ICD-10-CM

## 2025-02-11 DIAGNOSIS — H26.493: ICD-10-CM

## 2025-02-11 PROCEDURE — 92014 COMPRE OPH EXAM EST PT 1/>: CPT

## 2025-03-06 ENCOUNTER — CONSULTATION/EVALUATION (OUTPATIENT)
Age: OVER 89
End: 2025-03-06

## 2025-03-06 ENCOUNTER — SURGERY/PROCEDURE (OUTPATIENT)
Age: OVER 89
End: 2025-03-06

## 2025-03-06 DIAGNOSIS — H26.493: ICD-10-CM

## 2025-03-06 PROCEDURE — 6682150 YAG CAPSULOTOMY: Mod: 50

## 2025-03-06 PROCEDURE — 99214 OFFICE O/P EST MOD 30 MIN: CPT | Mod: 57

## 2025-03-19 ENCOUNTER — POST-OP (OUTPATIENT)
Age: OVER 89
End: 2025-03-19

## 2025-03-19 DIAGNOSIS — Z98.890: ICD-10-CM

## 2025-03-19 PROCEDURE — 99024 POSTOP FOLLOW-UP VISIT: CPT

## 2025-07-08 ENCOUNTER — APPOINTMENT (OUTPATIENT)
Dept: URBAN - METROPOLITAN AREA CLINIC 130 | Facility: CLINIC | Age: OVER 89
Setting detail: DERMATOLOGY
End: 2025-07-08

## 2025-07-08 DIAGNOSIS — Z85.828 PERSONAL HISTORY OF OTHER MALIGNANT NEOPLASM OF SKIN: ICD-10-CM

## 2025-07-08 DIAGNOSIS — L91.8 OTHER HYPERTROPHIC DISORDERS OF THE SKIN: ICD-10-CM

## 2025-07-08 DIAGNOSIS — L82.0 INFLAMED SEBORRHEIC KERATOSIS: ICD-10-CM

## 2025-07-08 DIAGNOSIS — L57.0 ACTINIC KERATOSIS: ICD-10-CM

## 2025-07-08 DIAGNOSIS — L57.8 OTHER SKIN CHANGES DUE TO CHRONIC EXPOSURE TO NONIONIZING RADIATION: ICD-10-CM

## 2025-07-08 PROCEDURE — ? LIQUID NITROGEN

## 2025-07-08 PROCEDURE — ?: Mod: 59

## 2025-07-08 PROCEDURE — ?

## 2025-07-08 PROCEDURE — ?: Mod: 25

## 2025-07-08 PROCEDURE — ? COUNSELING

## 2025-07-08 PROCEDURE — ? SUNSCREEN RECOMMENDATIONS

## 2025-07-08 ASSESSMENT — LOCATION SIMPLE DESCRIPTION DERM
LOCATION SIMPLE: RIGHT FOREARM
LOCATION SIMPLE: RIGHT CLAVICULAR SKIN
LOCATION SIMPLE: LEFT FOREARM
LOCATION SIMPLE: RIGHT FOREHEAD

## 2025-07-08 ASSESSMENT — LOCATION ZONE DERM
LOCATION ZONE: FACE
LOCATION ZONE: TRUNK
LOCATION ZONE: ARM

## 2025-07-08 ASSESSMENT — LOCATION DETAILED DESCRIPTION DERM
LOCATION DETAILED: RIGHT CLAVICULAR SKIN
LOCATION DETAILED: LEFT VENTRAL DISTAL FOREARM
LOCATION DETAILED: RIGHT VENTRAL DISTAL FOREARM
LOCATION DETAILED: RIGHT INFERIOR LATERAL FOREHEAD

## 2025-07-08 NOTE — PROCEDURE: LIQUID NITROGEN
Detail Level: Detailed
Show Applicator Variable?: Yes
Post-Care Instructions: I reviewed with the patient in detail both written and verbal post care instructions. Avoid swimming until the treated area heals. patient verbalizes understanding. Written instructions provided. Written instructions provided written instructions provided
Consent: The patient's consent was obtained including but not limited to risks of crusting, scabbing, blistering, scarring, darker or lighter pigmentary change, recurrence, incomplete removal and infection.
Application Tool (Optional): Liquid Nitrogen Sprayer
Duration Of Freeze Thaw-Cycle (Seconds): 8
Number Of Freeze-Thaw Cycles: 1 freeze-thaw cycle
Spray Paint Technique: No
Medical Necessity Information: It is in your best interest to select a reason for this procedure from the list below. All of these items fulfill various CMS LCD requirements except the new and changing color options.
Application Tool (Optional): Cry-AC
Medical Necessity Clause: This procedure was medically necessary because the lesions that were treated were: enlarging and causing irritation.
Post-Care Instructions: I reviewed with the patient in detail both written and verbal post care instructions. Avoid swimming until the treated area heals. patient verbalizes understanding. Written instructions provided. Written instructions provided written instructions provided. Written instruction provided
Spray Paint Text: The liquid nitrogen was applied to the skin utilizing a spray paint frosting technique.
Duration Of Freeze Thaw-Cycle (Seconds): 5-10

## 2025-08-13 ENCOUNTER — APPOINTMENT (OUTPATIENT)
Dept: URBAN - METROPOLITAN AREA CLINIC 130 | Facility: CLINIC | Age: OVER 89
Setting detail: DERMATOLOGY
End: 2025-08-13

## 2025-08-13 DIAGNOSIS — L82.1 OTHER SEBORRHEIC KERATOSIS: ICD-10-CM

## 2025-08-13 DIAGNOSIS — L82.0 INFLAMED SEBORRHEIC KERATOSIS: ICD-10-CM

## 2025-08-13 DIAGNOSIS — L57.0 ACTINIC KERATOSIS: ICD-10-CM

## 2025-08-13 DIAGNOSIS — D22 MELANOCYTIC NEVI: ICD-10-CM

## 2025-08-13 DIAGNOSIS — Z85.828 PERSONAL HISTORY OF OTHER MALIGNANT NEOPLASM OF SKIN: ICD-10-CM

## 2025-08-13 DIAGNOSIS — L57.8 OTHER SKIN CHANGES DUE TO CHRONIC EXPOSURE TO NONIONIZING RADIATION: ICD-10-CM

## 2025-08-13 PROBLEM — D22.5 MELANOCYTIC NEVI OF TRUNK: Status: ACTIVE | Noted: 2025-08-13

## 2025-08-13 PROCEDURE — ?: Mod: 25

## 2025-08-13 PROCEDURE — ? COUNSELING

## 2025-08-13 PROCEDURE — ? SUNSCREEN RECOMMENDATIONS

## 2025-08-13 PROCEDURE — ? LIQUID NITROGEN

## 2025-08-13 PROCEDURE — ?

## 2025-08-13 PROCEDURE — ? ADDITIONAL NOTES

## 2025-08-13 ASSESSMENT — LOCATION SIMPLE DESCRIPTION DERM
LOCATION SIMPLE: ABDOMEN
LOCATION SIMPLE: RIGHT LIP
LOCATION SIMPLE: RIGHT FOREARM
LOCATION SIMPLE: RIGHT CHEEK
LOCATION SIMPLE: RIGHT FOREHEAD
LOCATION SIMPLE: LEFT FOREHEAD

## 2025-08-13 ASSESSMENT — LOCATION DETAILED DESCRIPTION DERM
LOCATION DETAILED: RIGHT UPPER CUTANEOUS LIP
LOCATION DETAILED: EPIGASTRIC SKIN
LOCATION DETAILED: LEFT FOREHEAD
LOCATION DETAILED: RIGHT CENTRAL MALAR CHEEK
LOCATION DETAILED: RIGHT INFERIOR MEDIAL FOREHEAD
LOCATION DETAILED: PERIUMBILICAL SKIN
LOCATION DETAILED: RIGHT VENTRAL DISTAL FOREARM

## 2025-08-13 ASSESSMENT — LOCATION ZONE DERM
LOCATION ZONE: ARM
LOCATION ZONE: FACE
LOCATION ZONE: LIP
LOCATION ZONE: TRUNK